# Patient Record
Sex: MALE | URBAN - METROPOLITAN AREA
[De-identification: names, ages, dates, MRNs, and addresses within clinical notes are randomized per-mention and may not be internally consistent; named-entity substitution may affect disease eponyms.]

---

## 2017-11-06 ENCOUNTER — APPOINTMENT (RX ONLY)
Dept: URBAN - METROPOLITAN AREA CLINIC 50 | Facility: CLINIC | Age: 72
Setting detail: DERMATOLOGY
End: 2017-11-06

## 2017-11-06 DIAGNOSIS — N62 HYPERTROPHY OF BREAST: ICD-10-CM

## 2017-11-06 PROCEDURE — ? COUNSELING - GYNECOMASTIA

## 2017-11-06 PROCEDURE — 99203 OFFICE O/P NEW LOW 30 MIN: CPT

## 2017-11-06 NOTE — HPI: BREAST (GYNECOMASTIA)
Which Breast(S) Are You Concerned About?: bilateral breasts
Which Side(S)?: both breasts
How Severe Is The Gynecomastia?: moderate
Is This A New Presentation, Or A Follow-Up?: Gynecomastia
Date Of Abnormal Mammogram?: 08/23/17